# Patient Record
(demographics unavailable — no encounter records)

---

## 2018-02-07 NOTE — DIAGNOSTIC IMAGING REPORT
PROCEDURE:

Frontal and lateral views of the chest.

 

COMPARISON: None.

 

INDICATIONS:   PRE OPERATIVE FOR FOOT SURGERY

     

FINDINGS:

Lines/tubes:  None.

 

Lungs:  The lungs are well inflated and clear. There is no evidence of 

pneumonia or pulmonary edema.

 

Pleura:  There is no pleural effusion or pneumothorax.

 

Heart and mediastinum:  The heart and the mediastinum are normal.

 

Bones:  No acute bony abnormality.

 

IMPRESSION: 

 

1.  No acute cardiopulmonary disease.

 

Dictated by:  Jared Newsome M.D. on 2/07/2018 at 13:57     

Electronically approved by:  Jared Newsome M.D. on 2/07/2018 at 13:57

## 2018-02-09 NOTE — OPERATIVE REPORT
DATE OF PROCEDURE:  February 09, 2018 

 

PREOPERATIVE DIAGNOSES

1. Calcaneal varus, left foot.  

2. Bone spur with accessory navicular, left foot.  

3. Tendo Achillis contracture, left foot.



POSTOPERATIVE DIAGNOSES

1. Calcaneal varus, left foot.  

2. Bone spur with accessory navicular, left foot.  

3. Tendo Achillis contracture, left foot.



TITLE OF OPERATION

1. Exostectomy and excision of accessory navicular, left foot.

2. Lateral calcaneal slide osteotomy of the left foot.

3. Tendo Achillis lengthening, left foot.



ANESTHESIA:  General endotracheal.



HEMOSTASIS:  A left thigh tourniquet at 350 mmHg.



PROCEDURE IN DETAIL:  The patient was taken to the operating room in a 

mildly sedated state and placed upon the operating table in the supine 

position.  Following induction of general anesthetic, the left lower 

extremity was elevated to 60 degrees to exsanguinate before inflating the 

pneumatic thigh tourniquet to 350 mmHg to create hemostasis.  Left lower 

extremity was placed upon the operating table prior to performing the 

following procedure:



Procedure #1:  Excision of the accessory navicular and exostosis, medial 

navicular, left foot.  A linear incision was made overlying the accessory 

navicular and prominent bone on the medial aspect of the navicular.  This 

area was dissected down to the level of the prominence.  Fluoro and 

radiographs were taken to ensure the location.  The area was then resected 

with an oscillating saw and rotary bur, rasped smooth, irrigated with 

copious amounts of sterile saline solution.  Deep closure was 3-0 Vicryl.  

Subcutaneous closure 4-0 Vicryl.  Skin closure 4-0 nylon.  Care was taken 

to identify and retract all vital structures encountered.  The posterior 

tibial tendon at its insertion was left intact.  



Attention was then directed to the lateral aspect of the foot.  A lateral 

transverse incision was placed along the lateral aspect of the calcaneus, 

which is in varus positioning.  This incision was deepened via sharp and 

blunt dissection, with care being taken to identify and retract all vital 

structures encountered.  The lateral fibular wall was identified, and a 

[place] and [place] elevator were used to remove periosteum from the intended 

surgical site as well as both dorsally and plantarly on the calcaneous 

itself.  The through-and-through osteotomy was made straight.  An osteotome 

was used to finish the balance.  Medially, the lateral aspect was shifted 

lateralward to create a slide osteotomy on the lateral aspect of the heel.  

The slide was then pinned into position with 2 K-wires, and two 6.5 [place] 

cancellous screws were inserted across the screw line to create excellent 

compression.  This having been accomplished, the area was irrigated with 

copious amounts of sterile saline solution.  The screws were noted to be 

seated well, and a more lateral positioning of the heel was noted as 

opposed to the previous calcaneal varus.  The heel is now neutral.  The 

wound was closed over the hardware with 3-0 Vicryl and 4-0 nylon.  



Attention was then directed to the posterior aspect of the heel with the 

foot being elevated and a linear dissection revealing the entirety of the 

length of the plantar fascia, which the apparent state has always been 

shortened and seems to have contributed to the problem.  The transverse 

plane Z lengthening was performed, which allowed for significant 

lengthening of the Achilles tendon both proximally and distally.  This 

having been accomplished, the tendon was repaired with #2 FiberWire.  

Irrigated with copious amounts of sterile saline solution.  The periosteum 

was closed over a human tissue allograft sheet 4 x 8.  The closure over 

that sheet was performed with a combination of 3-0 Vicryl and 4-0 nylon.  

Skin staples were used on the posterior skin as well.  The release of the 

pneumatic thigh tourniquet showed a normal hyperemic flush to all digits.  



The patient tolerated both the anesthetic and the procedure very well.  A 

posterior splint was applied.  Patient is to remain nonweightbearing.  She 

was blocked with a local 0.5 Marcaine and Decadron [place] as well as Exparel to 

facilitate long-lasting block.  The release of the pneumatic thigh 

tourniquet showed a normal hyperemic flush to all digits to the left foot.  





The patient left the operating room with vital signs stable and in apparent 

satisfactory condition, having tolerated both the anesthetic and procedure 

very well.  









DD:  02/09/2018 10:58

DT:  02/09/2018 11:21

Job#:  [id number]

## 2018-02-09 NOTE — XMS REPORT
Patient Summary Document

 Created on: 2018



AGUSTIN ZAPIEN

External Reference #: [id number]

: 1976

Sex: Female



Demographics







 Address  [address] [place]  [address]

 

 Home Phone  [phone]

 

 Preferred Language  Unknown

 

 Marital Status  Unknown

 

 Taoist Affiliation  Unknown

 

 Race  Unknown

 

 Additional Race(s)  

 

 Ethnic Group  Unknown





Author







 Author  [facility]

 

 Address  Unknown

 

 Phone  Unavailable







Care Team Providers







 Care Team Member Name  Role  Phone

 

 BREA GOODWIN  Unavailable  Unavailable







Problems

This patient has no known problems.



Allergies, Adverse Reactions, Alerts

This patient has no known allergies or adverse reactions.



Medications

This patient has no known medications.



Results







 Test Description  Test Time  Test Comments  Text Results  Atomic Results  
Result Comments









 CHEST 2 VIEWS            [place] [address]      Patient Name: AGUSTIN ZAPIEN   
MR #: [id number]    : 1976 Age/Sex: 41/F  Acct #: [id number] Req #: 
[id number]  Adm Physician:     Ordered by: BREA GOODWIN DPM  Report #: [id number]
[id number]   Location: OR  Room/Bed:     _____________________________________________
______________________________________________________    Procedure: [id number] 
DX/CHEST 2 VIEWS  Exam Date: 18                            Exam Time: 
1320       REPORT STATUS: Signed    PROCEDURE:   Frontal and lateral views of 
the chest.       COMPARISON: None.       INDICATIONS:   PRE OPERATIVE FOR FOOT 
SURGERY           FINDINGS:   Lines/tubes:  None.       Lungs:  The lungs are 
well inflated and clear. There is no evidence of    pneumonia or pulmonary 
edema.       Pleura:  There is no pleural effusion or pneumothorax.       Heart 
and mediastinum:  The heart and the mediastinum are normal.       Bones:  No 
acute bony abnormality.       IMPRESSION:        1.  No acute cardiopulmonary 
disease.       Dictated by:  Jared Shipman M.D. on 2018 at 13:57        
Electronically approved by:  Jared Shipman M.D. on 2018 at 13:57         
          Dictated By: JARED SHIPMAN MD  Electronically Signed By: JARED SHIPMAN MD on 18 1357  Transcribed By: GABY on 18 1355       COPY 
TO:   BREA GOODWIN DPM

## 2019-10-10 NOTE — NUR
pt did not want crutches. pt has scooter at home and wants to use that. md 
notified and ok with that. extra ace wrap for home and fall socks

## 2019-10-10 NOTE — DIAGNOSTIC IMAGING REPORT
Right ankle, 3 views.

Right foot, 3 views.



History: Right lower extremity pain.



Findings: There is mild diffuse soft tissue swelling at the ankle. Bone

mineralization is normal. There is no evidence of fracture or dislocation.

There are no lytic or sclerotic lesions. The joint spaces are within normal

limits.





IMPRESSION:

Mild ankle soft tissue swelling. No acute osseous abnormality.



 



Signed by: Mark Anthony Romero on 10/10/2019 12:26 PM